# Patient Record
Sex: MALE | Race: OTHER | NOT HISPANIC OR LATINO | URBAN - METROPOLITAN AREA
[De-identification: names, ages, dates, MRNs, and addresses within clinical notes are randomized per-mention and may not be internally consistent; named-entity substitution may affect disease eponyms.]

---

## 2018-03-01 ENCOUNTER — OUTPATIENT (OUTPATIENT)
Dept: OUTPATIENT SERVICES | Facility: HOSPITAL | Age: 28
LOS: 1 days | End: 2018-03-01
Payer: MEDICAID

## 2018-03-01 PROCEDURE — G9001: CPT

## 2018-03-21 DIAGNOSIS — R69 ILLNESS, UNSPECIFIED: ICD-10-CM

## 2020-08-05 ENCOUNTER — APPOINTMENT (OUTPATIENT)
Dept: UROLOGY | Facility: CLINIC | Age: 30
End: 2020-08-05
Payer: MEDICAID

## 2020-08-05 VITALS — TEMPERATURE: 97.8 F

## 2020-08-05 VITALS — DIASTOLIC BLOOD PRESSURE: 85 MMHG | SYSTOLIC BLOOD PRESSURE: 128 MMHG | HEART RATE: 98 BPM

## 2020-08-05 DIAGNOSIS — Z84.1 FAMILY HISTORY OF DISORDERS OF KIDNEY AND URETER: ICD-10-CM

## 2020-08-05 PROBLEM — Z00.00 ENCOUNTER FOR PREVENTIVE HEALTH EXAMINATION: Status: ACTIVE | Noted: 2020-08-05

## 2020-08-05 PROCEDURE — 99204 OFFICE O/P NEW MOD 45 MIN: CPT

## 2020-08-05 RX ORDER — GLYBURIDE AND METFORMIN HYDROCHLORIDE 5; 500 MG/1; MG/1
5-500 TABLET, FILM COATED ORAL
Refills: 0 | Status: ACTIVE | COMMUNITY
Start: 2020-08-05

## 2020-08-31 ENCOUNTER — OUTPATIENT (OUTPATIENT)
Dept: OUTPATIENT SERVICES | Facility: HOSPITAL | Age: 30
LOS: 1 days | End: 2020-08-31
Payer: MEDICAID

## 2020-08-31 VITALS
WEIGHT: 248.9 LBS | OXYGEN SATURATION: 99 % | DIASTOLIC BLOOD PRESSURE: 84 MMHG | HEART RATE: 83 BPM | HEIGHT: 74 IN | TEMPERATURE: 97 F | SYSTOLIC BLOOD PRESSURE: 131 MMHG | RESPIRATION RATE: 18 BRPM

## 2020-08-31 DIAGNOSIS — N47.1 PHIMOSIS: ICD-10-CM

## 2020-08-31 DIAGNOSIS — E11.9 TYPE 2 DIABETES MELLITUS WITHOUT COMPLICATIONS: ICD-10-CM

## 2020-08-31 DIAGNOSIS — Z01.818 ENCOUNTER FOR OTHER PREPROCEDURAL EXAMINATION: ICD-10-CM

## 2020-08-31 LAB
A1C WITH ESTIMATED AVERAGE GLUCOSE RESULT: 8 % — HIGH (ref 4–5.6)
ANION GAP SERPL CALC-SCNC: 11 MMOL/L — SIGNIFICANT CHANGE UP (ref 5–17)
BUN SERPL-MCNC: 8 MG/DL — SIGNIFICANT CHANGE UP (ref 7–23)
CALCIUM SERPL-MCNC: 9.4 MG/DL — SIGNIFICANT CHANGE UP (ref 8.4–10.5)
CHLORIDE SERPL-SCNC: 98 MMOL/L — SIGNIFICANT CHANGE UP (ref 96–108)
CO2 SERPL-SCNC: 24 MMOL/L — SIGNIFICANT CHANGE UP (ref 22–31)
CREAT SERPL-MCNC: 0.57 MG/DL — SIGNIFICANT CHANGE UP (ref 0.5–1.3)
ESTIMATED AVERAGE GLUCOSE: 183 MG/DL — HIGH (ref 68–114)
GLUCOSE SERPL-MCNC: 175 MG/DL — HIGH (ref 70–99)
HCT VFR BLD CALC: 42.5 % — SIGNIFICANT CHANGE UP (ref 39–50)
HGB BLD-MCNC: 13.4 G/DL — SIGNIFICANT CHANGE UP (ref 13–17)
MCHC RBC-ENTMCNC: 24.6 PG — LOW (ref 27–34)
MCHC RBC-ENTMCNC: 31.5 GM/DL — LOW (ref 32–36)
MCV RBC AUTO: 78.1 FL — LOW (ref 80–100)
NRBC # BLD: 0 /100 WBCS — SIGNIFICANT CHANGE UP (ref 0–0)
PLATELET # BLD AUTO: 280 K/UL — SIGNIFICANT CHANGE UP (ref 150–400)
POTASSIUM SERPL-MCNC: 4.5 MMOL/L — SIGNIFICANT CHANGE UP (ref 3.5–5.3)
POTASSIUM SERPL-SCNC: 4.5 MMOL/L — SIGNIFICANT CHANGE UP (ref 3.5–5.3)
RBC # BLD: 5.44 M/UL — SIGNIFICANT CHANGE UP (ref 4.2–5.8)
RBC # FLD: 13.6 % — SIGNIFICANT CHANGE UP (ref 10.3–14.5)
SODIUM SERPL-SCNC: 133 MMOL/L — LOW (ref 135–145)
WBC # BLD: 6.72 K/UL — SIGNIFICANT CHANGE UP (ref 3.8–10.5)
WBC # FLD AUTO: 6.72 K/UL — SIGNIFICANT CHANGE UP (ref 3.8–10.5)

## 2020-08-31 PROCEDURE — 83036 HEMOGLOBIN GLYCOSYLATED A1C: CPT

## 2020-08-31 PROCEDURE — 85027 COMPLETE CBC AUTOMATED: CPT

## 2020-08-31 PROCEDURE — 87086 URINE CULTURE/COLONY COUNT: CPT

## 2020-08-31 PROCEDURE — 80048 BASIC METABOLIC PNL TOTAL CA: CPT

## 2020-08-31 PROCEDURE — G0463: CPT

## 2020-08-31 RX ORDER — SODIUM CHLORIDE 9 MG/ML
3 INJECTION INTRAMUSCULAR; INTRAVENOUS; SUBCUTANEOUS EVERY 8 HOURS
Refills: 0 | Status: DISCONTINUED | OUTPATIENT
Start: 2020-09-08 | End: 2020-09-22

## 2020-08-31 RX ORDER — LIDOCAINE HCL 20 MG/ML
0.2 VIAL (ML) INJECTION ONCE
Refills: 0 | Status: DISCONTINUED | OUTPATIENT
Start: 2020-09-08 | End: 2020-09-22

## 2020-08-31 NOTE — H&P PST ADULT - HISTORY OF PRESENT ILLNESS
This is a 31 y/o male c/o phimosis , he presents today for circumcision.  COVID 19 PCR will be done 9/5 at [facility]    ******positive  urine culture 8/18/2020, pt stated that he was not treated ,c/o occasional burning on urination, urine C/S will be obtained today ******* This is a 31 y/o male c/o phimosis , he presents today for circumcision.  COVID 19 PCR will be done 9/5 at [facility]    ******positive  urine culture 8/18/2020, pt stated that he was not treated ,c/o occasional burning on urination, urine C/S will be obtained today *******    ***9/1/20 Urine cx is positive, Dr. Hoenig notified,  will rx Augmenting 875mg BID x 1 week , patient was notified *** KILEY Marin NP This is a 29 y/o male c/o phimosis , he presents today for circumcision.  COVID 19 PCR will be done 9/5 at [facility]    ******positive  urine culture 8/18/2020, 9/1/20 Urine cx is positive, Dr. Hoenig notified,  will rx Augmenting 875mg BID x 1 week , patient was notified *** KILEY Marin NP

## 2020-08-31 NOTE — H&P PST ADULT - ATTENDING COMMENTS
Pt is 29 yo male with phimosis, DM  For circumcision at his preference.  Continues on abx, to finish in next 1-2 days

## 2020-08-31 NOTE — H&P PST ADULT - NSICDXPROBLEM_GEN_ALL_CORE_FT
PROBLEM DIAGNOSES  Problem: Phimosis  Assessment and Plan: circumcision    Problem: DM (diabetes mellitus)  Assessment and Plan: hold glyburide-metformin 24 hours pre op

## 2020-08-31 NOTE — H&P PST ADULT - NSICDXFAMILYHX_GEN_ALL_CORE_FT
FAMILY HISTORY:  Father  Still living? Unknown  Family history of hypertension, Age at diagnosis: Age Unknown    Mother  Still living? Yes, Estimated age: Age Unknown  Family history of diabetes mellitus (DM), Age at diagnosis: Age Unknown  Family history of hypertension, Age at diagnosis: Age Unknown    Grandparent  Still living? Yes, Estimated age: Age Unknown  Family history of chronic congestive heart failure, Age at diagnosis: Age Unknown

## 2020-09-01 LAB
CULTURE RESULTS: SIGNIFICANT CHANGE UP
SPECIMEN SOURCE: SIGNIFICANT CHANGE UP

## 2020-09-02 DIAGNOSIS — Z01.818 ENCOUNTER FOR OTHER PREPROCEDURAL EXAMINATION: ICD-10-CM

## 2020-09-05 ENCOUNTER — APPOINTMENT (OUTPATIENT)
Dept: DISASTER EMERGENCY | Facility: CLINIC | Age: 30
End: 2020-09-05

## 2020-09-05 LAB — SARS-COV-2 N GENE NPH QL NAA+PROBE: NOT DETECTED

## 2020-09-06 LAB
APPEARANCE: CLEAR
BACTERIA UR CULT: ABNORMAL
BACTERIA: NEGATIVE
BILIRUBIN URINE: NEGATIVE
BLOOD URINE: NEGATIVE
COLOR: NORMAL
GLUCOSE QUALITATIVE U: ABNORMAL
HYALINE CASTS: 1 /LPF
KETONES URINE: NEGATIVE
LEUKOCYTE ESTERASE URINE: NEGATIVE
MICROSCOPIC-UA: NORMAL
NITRITE URINE: NEGATIVE
PH URINE: 5.5
PROTEIN URINE: NEGATIVE
RED BLOOD CELLS URINE: 3 /HPF
SPECIFIC GRAVITY URINE: 1.02
SQUAMOUS EPITHELIAL CELLS: 1 /HPF
UROBILINOGEN URINE: NORMAL
WHITE BLOOD CELLS URINE: 2 /HPF

## 2020-09-07 ENCOUNTER — TRANSCRIPTION ENCOUNTER (OUTPATIENT)
Age: 30
End: 2020-09-07

## 2020-09-07 PROBLEM — Z84.1 FAMILY HISTORY OF KIDNEY STONES: Status: ACTIVE | Noted: 2020-08-05

## 2020-09-07 NOTE — REVIEW OF SYSTEMS
[Skin Wound] : skin wound [Negative] : Heme/Lymph [FreeTextEntry3] : feeling tired/sluggish also weight gain/loss

## 2020-09-07 NOTE — ASSESSMENT
[FreeTextEntry1] : Dr. David Hoenig introduced to patient and case reviewed. Risks and benefits discussed. Dr. Hoenig will schedule circumcision .

## 2020-09-07 NOTE — PHYSICAL EXAM
[General Appearance - Well Developed] : well developed [General Appearance - Well Nourished] : well nourished [Normal Appearance] : normal appearance [Well Groomed] : well groomed [General Appearance - In No Acute Distress] : no acute distress [Edema] : no peripheral edema [Respiration, Rhythm And Depth] : normal respiratory rhythm and effort [Exaggerated Use Of Accessory Muscles For Inspiration] : no accessory muscle use [Abdomen Soft] : soft [Abdomen Tenderness] : non-tender [Costovertebral Angle Tenderness] : no ~M costovertebral angle tenderness [FreeTextEntry1] : phimosis present but able with some pain to retract foreskin completely. no balanitis [Normal Station and Gait] : the gait and station were normal for the patient's age [Skin Color & Pigmentation] : normal skin color and pigmentation [Skin Turgor] : supple [] : no rash [No Focal Deficits] : no focal deficits [Oriented To Time, Place, And Person] : oriented to person, place, and time [Affect] : the affect was normal [Mood] : the mood was normal [Not Anxious] : not anxious [Femoral Lymph Nodes Enlarged Bilaterally] : femoral [Inguinal Lymph Nodes Enlarged Bilaterally] : inguinal

## 2020-09-08 ENCOUNTER — APPOINTMENT (OUTPATIENT)
Dept: UROLOGY | Facility: HOSPITAL | Age: 30
End: 2020-09-08

## 2020-09-08 ENCOUNTER — RESULT REVIEW (OUTPATIENT)
Age: 30
End: 2020-09-08

## 2020-09-08 ENCOUNTER — OUTPATIENT (OUTPATIENT)
Dept: OUTPATIENT SERVICES | Facility: HOSPITAL | Age: 30
LOS: 1 days | End: 2020-09-08
Payer: MEDICAID

## 2020-09-08 VITALS
TEMPERATURE: 97 F | DIASTOLIC BLOOD PRESSURE: 78 MMHG | OXYGEN SATURATION: 97 % | HEART RATE: 83 BPM | RESPIRATION RATE: 16 BRPM | SYSTOLIC BLOOD PRESSURE: 111 MMHG

## 2020-09-08 VITALS
WEIGHT: 248.9 LBS | TEMPERATURE: 98 F | DIASTOLIC BLOOD PRESSURE: 83 MMHG | RESPIRATION RATE: 16 BRPM | SYSTOLIC BLOOD PRESSURE: 126 MMHG | OXYGEN SATURATION: 100 % | HEART RATE: 86 BPM | HEIGHT: 74 IN

## 2020-09-08 DIAGNOSIS — N47.1 PHIMOSIS: ICD-10-CM

## 2020-09-08 DIAGNOSIS — Z01.818 ENCOUNTER FOR OTHER PREPROCEDURAL EXAMINATION: ICD-10-CM

## 2020-09-08 LAB — GLUCOSE BLDC GLUCOMTR-MCNC: 180 MG/DL — HIGH (ref 70–99)

## 2020-09-08 PROCEDURE — 54161 CIRCUM 28 DAYS OR OLDER: CPT

## 2020-09-08 PROCEDURE — 82962 GLUCOSE BLOOD TEST: CPT

## 2020-09-08 PROCEDURE — 88304 TISSUE EXAM BY PATHOLOGIST: CPT

## 2020-09-08 PROCEDURE — 88304 TISSUE EXAM BY PATHOLOGIST: CPT | Mod: 26

## 2020-09-08 RX ORDER — SODIUM CHLORIDE 9 MG/ML
1000 INJECTION, SOLUTION INTRAVENOUS
Refills: 0 | Status: DISCONTINUED | OUTPATIENT
Start: 2020-09-08 | End: 2020-09-22

## 2020-09-08 RX ORDER — DEXAMETHASONE 0.5 MG/5ML
8 ELIXIR ORAL ONCE
Refills: 0 | Status: DISCONTINUED | OUTPATIENT
Start: 2020-09-08 | End: 2020-09-22

## 2020-09-08 RX ORDER — OXYCODONE HYDROCHLORIDE 5 MG/1
5 TABLET ORAL ONCE
Refills: 0 | Status: DISCONTINUED | OUTPATIENT
Start: 2020-09-08 | End: 2020-09-08

## 2020-09-08 RX ORDER — GLYBURIDE-METFORMIN HYDROCHLORIDE 1.25; 25 MG/1; MG/1
1 TABLET ORAL
Qty: 0 | Refills: 0 | DISCHARGE

## 2020-09-08 NOTE — ASU DISCHARGE PLAN (ADULT/PEDIATRIC) - ASU DC SPECIAL INSTRUCTIONSFT
You may take up to 650mg of tylenol every 6 hours for pain.  You can alternate this with ibuprofen 400mg every 6 hours.  Do not take more than 4000mg of tylenol or 2400mg of ibuprofen in one day.    Please take your home prescription for antibiotics.    Your dressing must stay until Thursday, at which time you can take it off and shower like normal.    We have sent some percocet to your pharmacy - please take as prescribed.

## 2020-09-08 NOTE — ASU DISCHARGE PLAN (ADULT/PEDIATRIC) - CARE PROVIDER_API CALL
Hoenig, David M  UROLOGY  [facility], [address] [place] [address]  Phone: [phone]  Fax: [phone]  Follow Up Time:

## 2020-09-08 NOTE — ASU DISCHARGE PLAN (ADULT/PEDIATRIC) - NURSING INSTRUCTIONS
Tylenol/acetaminophen------------AND/OR-------------Motrin/ibuprofen  as needed for pain/discomfort.  NEXT DOSES:  Tylenol  OR  Tylenol-containing medication  OK @ 6:15pm this evening, if needed.  Motrin OK @ 6:00pm this evening, if needed.  Follow up with MD as requested; call office for appointment.

## 2020-09-08 NOTE — BRIEF OPERATIVE NOTE - OPERATION/FINDINGS
phimotic foreskin.  Proximal and distal circumferential lines measured and cut.  Sub q dissected off with electrocautery.  3-0 chromic sutures used to close.  Xeroform, gauze, and coband overtop.

## 2020-09-09 LAB — SURGICAL PATHOLOGY STUDY: SIGNIFICANT CHANGE UP

## 2020-09-09 RX ORDER — OXYCODONE AND ACETAMINOPHEN 5; 325 MG/1; MG/1
5-325 TABLET ORAL
Qty: 8 | Refills: 0 | Status: ACTIVE | COMMUNITY
Start: 2020-09-09 | End: 1900-01-01

## 2020-09-23 ENCOUNTER — APPOINTMENT (OUTPATIENT)
Dept: UROLOGY | Facility: CLINIC | Age: 30
End: 2020-09-23
Payer: MEDICAID

## 2020-09-23 VITALS — TEMPERATURE: 97.9 F

## 2020-09-23 DIAGNOSIS — A49.9 URINARY TRACT INFECTION, SITE NOT SPECIFIED: ICD-10-CM

## 2020-09-23 DIAGNOSIS — Z87.438 PERSONAL HISTORY OF OTHER DISEASES OF MALE GENITAL ORGANS: ICD-10-CM

## 2020-09-23 DIAGNOSIS — N39.0 URINARY TRACT INFECTION, SITE NOT SPECIFIED: ICD-10-CM

## 2020-09-23 PROCEDURE — 99213 OFFICE O/P EST LOW 20 MIN: CPT

## 2020-09-23 NOTE — PHYSICAL EXAM
[Abdomen Soft] : soft [Abdomen Tenderness] : non-tender [Costovertebral Angle Tenderness] : no ~M costovertebral angle tenderness [Penis Abnormality] : normal circumcised penis [FreeTextEntry1] : healing well circ site. meatus wnl. [] : no respiratory distress [Respiration, Rhythm And Depth] : normal respiratory rhythm and effort [Exaggerated Use Of Accessory Muscles For Inspiration] : no accessory muscle use [Oriented To Time, Place, And Person] : oriented to person, place, and time [Affect] : the affect was normal [Mood] : the mood was normal [Not Anxious] : not anxious [Normal Station and Gait] : the gait and station were normal for the patient's age

## 2020-09-23 NOTE — HISTORY OF PRESENT ILLNESS
[FreeTextEntry1] : Pt returns s/p circumcision on 9/8/20.\par \par H/o also of UTI preop, rx'ed. Pt notes some spraying of urine with voiding. O/w feeling well, healing well. [None] : no symptoms

## 2020-09-23 NOTE — ASSESSMENT
[FreeTextEntry1] : urine culture to ensure clearance of prior UTI\par \par Pt 'believes' diabetic control is good-- > but admits to not checking: RECOMMEND STRONGLY TO CHECK GLU Regularly!\par \par Will call and f/u by phone on urine culture today\par cont good postop care with bacitracin, washing\par RTC ~ 2 months

## 2020-09-25 LAB — BACTERIA UR CULT: NORMAL

## 2020-11-10 ENCOUNTER — APPOINTMENT (OUTPATIENT)
Dept: UROLOGY | Facility: CLINIC | Age: 30
End: 2020-11-10
Payer: MEDICAID

## 2020-11-10 VITALS — TEMPERATURE: 97.8 F

## 2020-11-10 PROCEDURE — 99072 ADDL SUPL MATRL&STAF TM PHE: CPT

## 2020-11-10 PROCEDURE — 99214 OFFICE O/P EST MOD 30 MIN: CPT

## 2020-11-10 NOTE — HISTORY OF PRESENT ILLNESS
[FreeTextEntry1] : 30 yr male had circumcision September 8, 2020 for phimosis.   Concerned that skin wound not healed completely 2 months after the procedure and bleeds at night , in the morning or when he has sexual intercourse.  \par   [Urinary Incontinence] : no urinary incontinence [Urinary Retention] : no urinary retention [Urinary Urgency] : no urinary urgency [Urinary Frequency] : no urinary frequency [Nocturia] : no nocturia [Straining] : no straining [Dysuria] : no dysuria [Hematuria - Gross] : no gross hematuria

## 2020-11-10 NOTE — ASSESSMENT
[Phimosis (605\N47.1)] : transection of abdomen [FreeTextEntry1] : 30 yr male , S/p circumcision. \par Incomplete wound healing around corona of glans with bleed during erection. \par \par Prescribe antibiotic ointment with analgesic\par Patient counselled to keep off sex for 4 more weeks as friction will irritate fragile granulation around wound and cause bleed. \par \par \par \par

## 2020-11-10 NOTE — REVIEW OF SYSTEMS
[Cough] : cough [Skin Wound] : skin wound [Itching] : itching [Fever] : no fever [Chills] : no chills [Eye Pain] : no eye pain [Earache] : no earache [Heart Rate Is Slow] : the heart rate was not slow [Chest Pain] : no chest pain [Shortness Of Breath] : no shortness of breath [Abdominal Pain] : no abdominal pain [Constipation] : no constipation [Dysuria] : no dysuria [Incontinence] : no incontinence [Nocturia] : no nocturia [Arthralgias] : no arthralgias [Joint Pain] : no joint pain [Skin Lesions] : no skin lesions [Confused] : no confusion [Dizziness] : no dizziness [Fainting] : no fainting [Suicidal] : not suicidal [Anxiety] : no anxiety [Depression] : no depression [Proptosis] : no proptosis

## 2020-11-10 NOTE — PHYSICAL EXAM
[General Appearance - Well Developed] : well developed [General Appearance - Well Nourished] : well nourished [Well Groomed] : well groomed [General Appearance - In No Acute Distress] : no acute distress [Abdomen Tenderness] : non-tender [Penis Abnormality] : normal circumcised penis [Skin Color & Pigmentation] : normal skin color and pigmentation [Edema] : no peripheral edema [] : no respiratory distress [Oriented To Time, Place, And Person] : oriented to person, place, and time [Normal Station and Gait] : the gait and station were normal for the patient's age [No Focal Deficits] : no focal deficits [FreeTextEntry1] : post circumcision wound bleed

## 2020-11-25 ENCOUNTER — APPOINTMENT (OUTPATIENT)
Dept: UROLOGY | Facility: CLINIC | Age: 30
End: 2020-11-25
Payer: MEDICAID

## 2020-11-25 VITALS — TEMPERATURE: 97.1 F

## 2020-11-25 DIAGNOSIS — Z87.438 PERSONAL HISTORY OF OTHER DISEASES OF MALE GENITAL ORGANS: ICD-10-CM

## 2020-11-25 PROCEDURE — 99212 OFFICE O/P EST SF 10 MIN: CPT | Mod: 25

## 2020-11-25 PROCEDURE — 17250 CHEM CAUT OF GRANLTJ TISSUE: CPT

## 2020-11-25 NOTE — ASSESSMENT
[FreeTextEntry1] : 4 silver nitrate sticks applied to granulation tissue with good control.\par no sig bleeding\par RTC 1 week to reassess, potentially re-apply silver nitrate\par \par D/w pt, reassured, will manage.

## 2020-11-25 NOTE — HISTORY OF PRESENT ILLNESS
[FreeTextEntry1] : Pt returns- now ~ 2.5 months s/p circumcision done for phimosis\par \par Had likely granulation tissue when he saw Dr. Waite ~ 11/10/20.  Having pain and some bleeding from the raw surface since then.\par \par Arrives for eval now

## 2020-11-25 NOTE — PHYSICAL EXAM
[Oriented To Time, Place, And Person] : oriented to person, place, and time [Affect] : the affect was normal [Mood] : the mood was normal [FreeTextEntry1] : mildly anxious

## 2020-12-02 ENCOUNTER — APPOINTMENT (OUTPATIENT)
Dept: UROLOGY | Facility: CLINIC | Age: 30
End: 2020-12-02
Payer: MEDICAID

## 2020-12-02 PROCEDURE — 17250 CHEM CAUT OF GRANLTJ TISSUE: CPT

## 2020-12-02 PROCEDURE — 99212 OFFICE O/P EST SF 10 MIN: CPT | Mod: 25

## 2020-12-02 PROCEDURE — 99072 ADDL SUPL MATRL&STAF TM PHE: CPT

## 2020-12-02 NOTE — ASSESSMENT
[FreeTextEntry1] : \par 4 silver nitrate sticks applied to granulation tissue with good control again this week\par no sig bleeding\par RTC ~3 weeks to reassess, potentially re-apply silver nitrate

## 2020-12-02 NOTE — HISTORY OF PRESENT ILLNESS
[None] : None [FreeTextEntry1] : 30 yr old male presents to follow up s/p circumcision for phimosis. Pt denies dysuria, hematuria, or abd abd pain. Pt returns for evaluation after silver nitrate applied to granulation tissue last week.  [Dysuria] : no dysuria [Hematuria - Gross] : no gross hematuria

## 2020-12-02 NOTE — PHYSICAL EXAM
[General Appearance - Well Developed] : well developed [Abdomen Soft] : soft [Heart Rate And Rhythm] : Heart rate and rhythm were normal [] : no respiratory distress [Oriented To Time, Place, And Person] : oriented to person, place, and time [Normal Station and Gait] : the gait and station were normal for the patient's age [No Focal Deficits] : no focal deficits [FreeTextEntry1] : much improved- no easy bleeding. remains with some skin separation at dorsum/some granulation tissure remains

## 2020-12-23 PROBLEM — N39.0 BACTERIAL UTI: Status: RESOLVED | Noted: 2020-09-01 | Resolved: 2020-12-23

## 2021-01-07 ENCOUNTER — APPOINTMENT (OUTPATIENT)
Dept: UROLOGY | Facility: CLINIC | Age: 31
End: 2021-01-07
Payer: MEDICAID

## 2021-01-07 VITALS — TEMPERATURE: 97.3 F

## 2021-01-07 DIAGNOSIS — E11.9 TYPE 2 DIABETES MELLITUS W/OUT COMPLICATIONS: ICD-10-CM

## 2021-01-07 DIAGNOSIS — N48.1 BALANITIS: ICD-10-CM

## 2021-01-07 PROCEDURE — 99213 OFFICE O/P EST LOW 20 MIN: CPT | Mod: 25

## 2021-01-07 PROCEDURE — 99072 ADDL SUPL MATRL&STAF TM PHE: CPT

## 2021-01-07 PROCEDURE — 17250 CHEM CAUT OF GRANLTJ TISSUE: CPT

## 2021-01-07 RX ORDER — AMOXICILLIN AND CLAVULANATE POTASSIUM 875; 125 MG/1; MG/1
875-125 TABLET, COATED ORAL
Qty: 14 | Refills: 0 | Status: COMPLETED | COMMUNITY
Start: 2020-09-01 | End: 2021-01-07

## 2021-01-07 RX ORDER — NYSTATIN 100000 [USP'U]/G
100000 CREAM TOPICAL
Qty: 1 | Refills: 1 | Status: ACTIVE | COMMUNITY
Start: 2021-01-07 | End: 1900-01-01

## 2021-01-07 RX ORDER — TRIAMCINOLONE ACETONIDE 1 MG/G
0.1 CREAM TOPICAL TWICE DAILY
Qty: 1 | Refills: 0 | Status: ACTIVE | COMMUNITY
Start: 2021-01-07 | End: 1900-01-01

## 2021-01-07 NOTE — HISTORY OF PRESENT ILLNESS
[FreeTextEntry1] : 30 yr old male presents to follow up s/p circumcision for phimosis. Pt denies dysuria, hematuria, or abd abd pain. Pt returns for evaluation after silver nitrate applied to granulation tissue last week. \par \par Had improved, but new tissue grew. Also, now more reddened. [Dysuria] : no dysuria [Hematuria - Gross] : no gross hematuria [None] : None

## 2021-01-07 NOTE — ASSESSMENT
[FreeTextEntry1] : \par 3 silver nitrate sticks applied to granulation tissue with good control again this week at midline dorsal granulation tissue\par \par Likely balanitis of glans\par \par no sig bleeding\par RTC ~2 weeks to reassess, potentially re-treat. we also discussed local anesthesia and fulguration in office as alternative to repeat silver nitrate given recurrence after initial good control following last treatment approx 1 month ago, and pt prefers to proceed with local anesthesia and heat-fulguration

## 2021-01-07 NOTE — PHYSICAL EXAM
[General Appearance - Well Developed] : well developed [Abdomen Soft] : soft [FreeTextEntry1] : much improved- no easy bleeding. remains with some skin separation at dorsum/some granulation tissure remains [Heart Rate And Rhythm] : Heart rate and rhythm were normal [] : no respiratory distress [Oriented To Time, Place, And Person] : oriented to person, place, and time [Normal Station and Gait] : the gait and station were normal for the patient's age [No Focal Deficits] : no focal deficits

## 2021-01-15 ENCOUNTER — APPOINTMENT (OUTPATIENT)
Dept: UROLOGY | Facility: CLINIC | Age: 31
End: 2021-01-15

## 2021-01-21 ENCOUNTER — APPOINTMENT (OUTPATIENT)
Dept: UROLOGY | Facility: CLINIC | Age: 31
End: 2021-01-21
Payer: MEDICAID

## 2021-01-21 VITALS
WEIGHT: 250 LBS | DIASTOLIC BLOOD PRESSURE: 85 MMHG | SYSTOLIC BLOOD PRESSURE: 126 MMHG | HEIGHT: 73 IN | BODY MASS INDEX: 33.13 KG/M2 | RESPIRATION RATE: 16 BRPM | HEART RATE: 94 BPM | OXYGEN SATURATION: 98 % | TEMPERATURE: 97.6 F

## 2021-01-21 DIAGNOSIS — L92.9 GRANULOMATOUS DISORDER OF THE SKIN AND SUBCUTANEOUS TISSUE, UNSPECIFIED: ICD-10-CM

## 2021-01-21 PROCEDURE — 99072 ADDL SUPL MATRL&STAF TM PHE: CPT

## 2021-01-21 PROCEDURE — 99212 OFFICE O/P EST SF 10 MIN: CPT

## 2021-01-25 NOTE — HISTORY OF PRESENT ILLNESS
[FreeTextEntry1] : 30 yr old male presents to follow up s/p circumcision for phimosis. Pt denies dysuria, hematuria, or abd abd pain. Pt returns for evaluation after silver nitrate applied to granulation tissue last week. \par \par Had improved, but new tissue grew. Treated with several silver  nitrate last visit- pt prepared to do heat ablation or excision under local, but reports fully resolved after last silver nitrate. [None] : no symptoms

## 2021-01-25 NOTE — PHYSICAL EXAM
[General Appearance - Well Developed] : well developed [General Appearance - Well Nourished] : well nourished [Normal Appearance] : normal appearance [Well Groomed] : well groomed [General Appearance - In No Acute Distress] : no acute distress [Abdomen Soft] : soft [Abdomen Tenderness] : non-tender [Costovertebral Angle Tenderness] : no ~M costovertebral angle tenderness [Penis Abnormality] : normal circumcised penis [FreeTextEntry1] : well healed- no evidence recurrent or persistent granulation tissue [] : no respiratory distress [Respiration, Rhythm And Depth] : normal respiratory rhythm and effort [Exaggerated Use Of Accessory Muscles For Inspiration] : no accessory muscle use [Oriented To Time, Place, And Person] : oriented to person, place, and time [Affect] : the affect was normal [Mood] : the mood was normal [Not Anxious] : not anxious [Normal Station and Gait] : the gait and station were normal for the patient's age

## 2021-10-01 NOTE — H&P PST ADULT - HEMATOLOGY/LYMPHATICS
Pt constipated since Monday per mom. 1 episode of vomiting today. Voiding normally and drinking normally.
negative

## 2023-01-24 NOTE — ASU PREOP CHECKLIST - PATIENT'S PERSONAL PROPERTY REMOVED
STD testing  You were tested for STDs on today:    As far the STD testing, we may hold off on any medications till the labs result. We will phone in medication for you if needed.  If we have decided to treat you now be sure to take all the meds as directed.  If you need more meds when the labs result we will call you and phone them in for you.  If you do test positive for STDs you should be retested in about 6 weeks again in 6 monts to ensure true negative results.    Increase condom use to prevent further occurance.  Notify sexual partners of the need for testing.  Complete ALL medication prescribed.  NO sexual intercourse for 7 days after treatment.      Today's testing will give no crediable information if you have unprotected sexual activities going forward.    
placed in locker by pt/glasses
